# Patient Record
Sex: FEMALE | Race: WHITE | NOT HISPANIC OR LATINO | URBAN - METROPOLITAN AREA
[De-identification: names, ages, dates, MRNs, and addresses within clinical notes are randomized per-mention and may not be internally consistent; named-entity substitution may affect disease eponyms.]

---

## 2022-02-01 ENCOUNTER — OFFICE VISIT (OUTPATIENT)
Dept: OBGYN | Facility: CLINIC | Age: 22
End: 2022-02-01
Attending: ADVANCED PRACTICE MIDWIFE
Payer: COMMERCIAL

## 2022-02-01 VITALS
SYSTOLIC BLOOD PRESSURE: 110 MMHG | DIASTOLIC BLOOD PRESSURE: 75 MMHG | BODY MASS INDEX: 17.55 KG/M2 | HEART RATE: 60 BPM | WEIGHT: 118.5 LBS | HEIGHT: 69 IN

## 2022-02-01 DIAGNOSIS — S76.311A STRAIN OF RIGHT HAMSTRING MUSCLE, INITIAL ENCOUNTER: ICD-10-CM

## 2022-02-01 PROBLEM — S86.899A SHIN SPLINTS: Status: ACTIVE | Noted: 2018-01-15

## 2022-02-01 PROBLEM — I73.00 RAYNAUDS DISEASE: Status: ACTIVE | Noted: 2017-06-23

## 2022-02-01 PROBLEM — M84.374G: Status: ACTIVE | Noted: 2020-04-17

## 2022-02-01 PROCEDURE — G0463 HOSPITAL OUTPT CLINIC VISIT: HCPCS

## 2022-02-01 PROCEDURE — 99204 OFFICE O/P NEW MOD 45 MIN: CPT | Performed by: ADVANCED PRACTICE MIDWIFE

## 2022-02-01 ASSESSMENT — ANXIETY QUESTIONNAIRES
7. FEELING AFRAID AS IF SOMETHING AWFUL MIGHT HAPPEN: NOT AT ALL
1. FEELING NERVOUS, ANXIOUS, OR ON EDGE: NOT AT ALL
3. WORRYING TOO MUCH ABOUT DIFFERENT THINGS: NOT AT ALL
6. BECOMING EASILY ANNOYED OR IRRITABLE: NOT AT ALL
5. BEING SO RESTLESS THAT IT IS HARD TO SIT STILL: NOT AT ALL
GAD7 TOTAL SCORE: 0
2. NOT BEING ABLE TO STOP OR CONTROL WORRYING: NOT AT ALL

## 2022-02-01 ASSESSMENT — MIFFLIN-ST. JEOR: SCORE: 1356.89

## 2022-02-01 ASSESSMENT — PATIENT HEALTH QUESTIONNAIRE - PHQ9: 5. POOR APPETITE OR OVEREATING: NOT AT ALL

## 2022-02-01 NOTE — LETTER
"2/1/2022       RE: Lily Henson  [address]  [facility] [address]     Dear Colleague,    Thank you for referring your patient, Lliy Henson, to the [facility] [place] at [facility]. Please see a copy of my visit note below.        Office Problem Visit        Assessment/Plan:  1)  21 yo G0 contraceptive questions: Reviewed that Mirena IUD is effective for 7 years in preventing pregnancy. Typically women continue to ovulate 70% of the time with Mirena. Loss of bone density is not something that is typically associated with Mirena. Will investigate this further and report back to Lily   -Reviewed Articles regarding Bone Density and IUD use. Shared that \"Use of progestin-releasing intrauterine device is associated with low systemic progestin levels that do not appear to impact ovarian estradiol production or bone mineral denisty.\" (Skye Bazan, 2009. Use of hormonal contraception in adolescents: skeletal health issues. Current Opinion in Obstetrics & Gynecology, 21: 396-401.)  -\"Folliculogenesis usually remains normal in LNG-IUS users, the endogenous estradiol levels in LNG-IUS, and hence the risk of BMD loss is not plausible. The LNG-IUS has no negative effects on bone mass denisty, even during long-term use.\" Nabil Llanos P. & FLAKO Gu 2011. Recent developments in the clinical use of levonorgestrel-releasing intrauterine system. [employer] Federation of Societies of Obstetrics & Gynecology 90: 7164-1696.)  -discussed that not many young women have their bone density assessed, and this could be an incidental finding.   - Advised against Paragard given her history of heavy painful menstrual cycles.   -After discussing her options, Lily would like to keep her Mirena IUD in place for now.  -Encouraged increasing her BMI by eating more high fat foods such as avocados, nuts, full fat " yogurt, milk, etc.   -Encouraged daily calcium and vitamin D intake 1,200mg Calcium and 2,000 international unit(s) vitamin D  2)  Preventive health exam: encouraged Lily to schedule this summer when she is due.   3.) Pelvic floor, gluteus and hamstring pain strain. Recommended pelvic floor PT, referral placed.     Subjective:    Lily is a 22 year old  female who presents to discuss removing her IUD.      She is a new patient to the [facility].   LMP has brown spotting for 4-5 days once a month.  Currently sexually active with one male partner. 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 She was getting 2-3 stress fractures/year and had a bone density scan that showed she had osteopenia in  with a T-score of 0.9. She has a low BMI (today is 17.5). She has always been tall and lean. She weighs slightly less than she normally does because she is not lifting weights.   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 She is now running 4-5 days a week with some biking. (she is running much less now that she is out of school and has not had a stress fracture).  Lily is wondering if she is not ovulating and if that might be contributing to her low density.     Last February pulled her right gluteus thomas and still occasionally has shooting pain down her left leg. She feels like this pain recurs when she is getting her brown spotting/cycling    Lily's menstrual cycles typically are heavy and painful when she is not on the pill or Mirena.      Denies abnormal vaginal discharge, itching, irritation, odor, dyspareunia, or dysuria.      Review of Systems:  Also a 12 point comprehensive review of systems was negative except as noted.  "    Medical, Surgical, Social and Family history reviewed.       Objective:   Vitals:    Vitals:    22 1424   BP: 110/75   BP Location: Left arm   Patient Position: Chair   Pulse: 60   Weight: 53.8 kg (118 lb 8 oz)   Height: 1.753 m (5' 9\")       Physical Exam:  General: Pleasant, articulate, well-groomed, well-nourished female.  Not in any apparent distress  Neck: Supple  Lungs:  nonlabored breathing pattern  Neuro: alert and oriented x 3     Time spent:  Chart review/Pre-chartin min on day of service  Face-to-face visit: 40   Documentation: 5  Total time spent on the day of service: 50 minutes      CINTIA Rae CNM          Again, thank you for allowing me to participate in the care of your patient.      Sincerely,    Augusta Vila CNM      "

## 2022-02-01 NOTE — PROGRESS NOTES
"    Office Problem Visit        Assessment/Plan:  1)  21 yo G0 contraceptive questions: Reviewed that Mirena IUD is effective for 7 years in preventing pregnancy. Typically women continue to ovulate 70% of the time with Mirena. Loss of bone density is not something that is typically associated with Mirena. Will investigate this further and report back to Lily   -Reviewed Articles regarding Bone Density and IUD use. Shared that \"Use of progestin-releasing intrauterine device is associated with low systemic progestin levels that do not appear to impact ovarian estradiol production or bone mineral denisty.\" (Skye Bazan, 2009. Use of hormonal contraception in adolescents: skeletal health issues. Current Opinion in Obstetrics & Gynecology, 21: 396-401.)  -\"Folliculogenesis usually remains normal in LNG-IUS users, the endogenous estradiol levels in LNG-IUS, and hence the risk of BMD loss is not plausible. The LNG-IUS has no negative effects on bone mass denisty, even during long-term use.\" Nabil Llanos P. & FLAKO Gu 2011. Recent developments in the clinical use of levonorgestrel-releasing intrauterine system. [employer] Federation of Societies of Obstetrics & Gynecology 90: 2882-9279.)  -discussed that not many young women have their bone density assessed, and this could be an incidental finding.   - Advised against Paragard given her history of heavy painful menstrual cycles.   -After discussing her options, Lily would like to keep her Mirena IUD in place for now.  -Encouraged increasing her BMI by eating more high fat foods such as avocados, nuts, full fat yogurt, milk, etc.   -Encouraged daily calcium and vitamin D intake 1,200mg Calcium and 2,000 international unit(s) vitamin D  2)  Preventive health exam: encouraged Lily to schedule this summer when she is due.   3.) Pelvic floor, gluteus and hamstring pain strain. Recommended pelvic floor PT, referral placed.     Subjective:    Lily is " "a 22 year old  female who presents to discuss removing her IUD.      She is a new patient to the [facility].   LMP has brown spotting for 4-5 days once a month.  Currently sexually active with one male partner. 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 She was getting 2-3 stress fractures/year and had a bone density scan that showed she had osteopenia in 2019 with a T-score of 0.9. She has a low BMI (today is 17.5). She has always been tall and lean. She weighs slightly less than she normally does because she is not lifting weights.   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 She is now running 4-5 days a week with some biking. (she is running much less now that she is out of school and has not had a stress fracture).  Lily is wondering if she is not ovulating and if that might be contributing to her low density.     Last February pulled her right gluteus thomas and still occasionally has shooting pain down her left leg. She feels like this pain recurs when she is getting her brown spotting/cycling    Lily's menstrual cycles typically are heavy and painful when she is not on the pill or Mirena.      Denies abnormal vaginal discharge, itching, irritation, odor, dyspareunia, or dysuria.      Review of Systems:  Also a 12 point comprehensive review of systems was negative except as noted.     Medical, Surgical, Social and Family history reviewed.       Objective:   Vitals:    Vitals:    22 1424   BP: 110/75   BP Location: Left arm   Patient Position: Chair   Pulse: 60   Weight: 53.8 kg (118 lb 8 oz)   Height: 1.753 m (5' 9\")       Physical Exam:  General: Pleasant, articulate, well-groomed, well-nourished female.  Not in any " apparent distress  Neck: Supple  Lungs:  nonlabored breathing pattern  Neuro: alert and oriented x 3     Time spent:  Chart review/Pre-chartin min on day of service  Face-to-face visit: 40   Documentation: 5  Total time spent on the day of service: 50 minutes      CINTIA Rae, CNM

## 2022-02-02 ASSESSMENT — ANXIETY QUESTIONNAIRES: GAD7 TOTAL SCORE: 0

## 2022-02-03 PROBLEM — S76.311A STRAIN OF RIGHT HAMSTRING MUSCLE: Status: ACTIVE | Noted: 2022-02-03

## 2022-02-06 ENCOUNTER — HEALTH MAINTENANCE LETTER (OUTPATIENT)
Age: 22
End: 2022-02-06

## 2022-10-03 ENCOUNTER — HEALTH MAINTENANCE LETTER (OUTPATIENT)
Age: 22
End: 2022-10-03

## 2022-10-27 ENCOUNTER — OFFICE VISIT (OUTPATIENT)
Dept: FAMILY MEDICINE | Facility: CLINIC | Age: 22
End: 2022-10-27
Payer: COMMERCIAL

## 2022-10-27 VITALS
DIASTOLIC BLOOD PRESSURE: 74 MMHG | HEIGHT: 69 IN | HEART RATE: 50 BPM | TEMPERATURE: 98.5 F | SYSTOLIC BLOOD PRESSURE: 115 MMHG | BODY MASS INDEX: 17.77 KG/M2 | OXYGEN SATURATION: 100 % | WEIGHT: 120 LBS

## 2022-10-27 DIAGNOSIS — Z12.4 CERVICAL CANCER SCREENING: ICD-10-CM

## 2022-10-27 DIAGNOSIS — Z13.1 SCREENING FOR DIABETES MELLITUS: ICD-10-CM

## 2022-10-27 DIAGNOSIS — Z11.3 SCREENING FOR STDS (SEXUALLY TRANSMITTED DISEASES): ICD-10-CM

## 2022-10-27 DIAGNOSIS — G43.809 OTHER MIGRAINE WITHOUT STATUS MIGRAINOSUS, NOT INTRACTABLE: ICD-10-CM

## 2022-10-27 DIAGNOSIS — D50.8 IRON DEFICIENCY ANEMIA SECONDARY TO INADEQUATE DIETARY IRON INTAKE: ICD-10-CM

## 2022-10-27 DIAGNOSIS — Z13.220 SCREENING FOR CHOLESTEROL LEVEL: Primary | ICD-10-CM

## 2022-10-27 PROCEDURE — 99385 PREV VISIT NEW AGE 18-39: CPT

## 2022-10-27 PROCEDURE — 99214 OFFICE O/P EST MOD 30 MIN: CPT | Mod: 25

## 2022-10-27 PROCEDURE — G0145 SCR C/V CYTO,THINLAYER,RESCR: HCPCS

## 2022-10-27 RX ORDER — NAPROXEN 500 MG/1
500 TABLET ORAL 2 TIMES DAILY PRN
Qty: 30 TABLET | Refills: 3 | Status: SHIPPED | OUTPATIENT
Start: 2022-10-27 | End: 2023-01-02

## 2022-10-27 ASSESSMENT — ENCOUNTER SYMPTOMS
WEAKNESS: 0
HEARTBURN: 0
CHILLS: 0
COUGH: 0
NERVOUS/ANXIOUS: 0
NAUSEA: 0
FEVER: 0
SORE THROAT: 0
HEMATOCHEZIA: 0
DYSURIA: 0
HEADACHES: 0
PARESTHESIAS: 0
DIARRHEA: 0
MYALGIAS: 0
CONSTIPATION: 0
ARTHRALGIAS: 0
ABDOMINAL PAIN: 0
HEMATURIA: 0
EYE PAIN: 0
DIZZINESS: 0
JOINT SWELLING: 0
PALPITATIONS: 0
SHORTNESS OF BREATH: 0
FREQUENCY: 0

## 2022-10-27 ASSESSMENT — PAIN SCALES - GENERAL: PAINLEVEL: NO PAIN (0)

## 2022-10-27 NOTE — PROGRESS NOTES
SUBJECTIVE:   CC: Lily is an 22 year old who presents for preventive health visit.     History of bone density, iron issues, post injuries from collegiate running.    HPI:   Gets migraines in the fall in the change of medications. Sharp, pounding pain in the head. In the cold, patient also experiences nausea. This occurs on a seasonal basis.Sharp behind the eyes, pounding, from the neck. She has tried ibuprofen/Advil without relief and does not currently take any migraine prophylaxis. She reports having a history of these headaches since college.    Patient has been advised of split billing requirements and indicates understanding: Yes  Healthy Habits:     Getting at least 3 servings of Calcium per day:  Yes    Bi-annual eye exam:  NO    Dental care twice a year:  Yes    Sleep apnea or symptoms of sleep apnea:  None    Diet:  Regular (no restrictions)    Frequency of exercise:  6-7 days/week    Duration of exercise:  45-60 minutes    Taking medications regularly:  Not Applicable    Medication side effects:  Not applicable    PHQ-2 Total Score: 0    Additional concerns today:  No    Exercise: running (less flaring up pain)  Occupation: - [employer] major.  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  Sleep: Not great at baseline, but has been good lately.    MH: no depression or anxiety    Today's PHQ-2 Score:   PHQ-2 ( 1999 Pfizer) 10/27/2022   Q1: Little interest or pleasure in doing things 0   Q2: Feeling down, depressed or hopeless 0   PHQ-2 Score 0   Q1: Little interest or pleasure in doing things Not at all   Q2: Feeling down, depressed or hopeless Not at all   PHQ-2 Score 0       Abuse: Current or Past (Physical, Sexual or Emotional) - No  Do you feel safe in your environment? Yes    Have you ever done Advance Care Planning? (For example, a Health Directive, POLST, or a discussion with a medical provider or your loved ones about your wishes): No, advance care  planning information given to patient to review.  Patient declined advance care planning discussion at this time.    Social History     Tobacco Use     Smoking status: Never     Smokeless tobacco: Never   Substance Use Topics     Alcohol use: Yes     Comment: 5-7 drinks/wk (spaced out throughout the week).     If you drink alcohol do you typically have >3 drinks per day or >7 drinks per week? Yes      Alcohol Use 10/27/2022   Prescreen: >3 drinks/day or >7 drinks/week? Yes   Prescreen: >3 drinks/day or >7 drinks/week? -   AUDIT SCORE  6     AUDIT - Alcohol Use Disorders Identification Test - Reproduced from the World Health Organization Audit 2001 (Second Edition) 10/27/2022   1.  How often do you have a drink containing alcohol? 2 to 3 times a week   2.  How many drinks containing alcohol do you have on a typical day when you are drinking? 1 or 2   3.  How often do you have five or more drinks on one occasion? Weekly   4.  How often during the last year have you found that you were not able to stop drinking once you had started? Never   5.  How often during the last year have you failed to do what was normally expected of you because of drinking? Never   6.  How often during the last year have you needed a first drink in the morning to get yourself going after a heavy drinking session? Never   7.  How often during the last year have you had a feeling of guilt or remorse after drinking? Never   8.  How often during the last year have you been unable to remember what happened the night before because of your drinking? Never   9.  Have you or someone else been injured because of your drinking? No   10. Has a relative, friend, doctor or other health care worker been concerned about your drinking or suggested you cut down? No   TOTAL SCORE 6         Breast Cancer Screening: Not due for mammogram.         History of abnormal Pap smear: NO - age 21-29 PAP every 3 years recommended     Reviewed and updated as needed this  "visit by clinical staff   Tobacco  Allergies    Med Hx  Surg Hx  Fam Hx  Soc Hx        Reviewed and updated as needed this visit by Provider   Tobacco     Med Hx  Surg Hx  Fam Hx           Review of Systems   Constitutional: Negative for chills and fever.   HENT: Negative for congestion, ear pain, hearing loss and sore throat.    Eyes: Negative for pain and visual disturbance.   Respiratory: Negative for cough and shortness of breath.    Cardiovascular: Negative for chest pain, palpitations and peripheral edema.   Gastrointestinal: Negative for abdominal pain, constipation, diarrhea, heartburn, hematochezia and nausea.   Genitourinary: Negative for dysuria, frequency, genital sores, hematuria and urgency.   Musculoskeletal: Negative for arthralgias, joint swelling and myalgias.   Skin: Negative for rash.   Neurological: Negative for dizziness, weakness, headaches and paresthesias.   Psychiatric/Behavioral: Negative for mood changes. The patient is not nervous/anxious.        OBJECTIVE:   /74 (BP Location: Left arm, Patient Position: Sitting, Cuff Size: Adult Regular)   Pulse 50   Temp 98.5  F (36.9  C) (Temporal)   Ht 1.743 m (5' 8.62\")   Wt 54.4 kg (120 lb)   LMP 10/10/2022 (Exact Date)   SpO2 100%   BMI 17.92 kg/m    Physical Exam  GENERAL: healthy, alert and no distress  EYES: Eyes grossly normal to inspection, PERRL and conjunctivae and sclerae normal  HENT: ear canals and TM's normal, nose and mouth without ulcers or lesions  NECK: no adenopathy, no asymmetry, masses, or scars and thyroid normal to palpation  RESP: lungs clear to auscultation - no rales, rhonchi or wheezes  BREAST: normal without masses, tenderness or nipple discharge and no palpable axillary masses or adenopathy  CV: regular rate and rhythm, normal S1 S2, no S3 or S4, no murmur, click or rub, no peripheral edema and peripheral pulses strong  ABDOMEN: soft, nontender, no hepatosplenomegaly, no masses and bowel sounds " "normal   (female): normal female external genitalia, normal urethral meatus, vaginal mucosa pink, moist, well rugated, and normal cervix/adnexa/uterus without masses or discharge  MS: no gross musculoskeletal defects noted, no edema  SKIN: no suspicious lesions or rashes  NEURO: Normal strength and tone, mentation intact and speech normal  PSYCH: mentation appears normal, affect normal/bright      ASSESSMENT/PLAN:   (Z13.220) Screening for cholesterol level  (primary encounter diagnosis)  Plan: Lipid panel reflex to direct LDL Fasting    (Z11.3) Screening for STDs (sexually transmitted diseases)  Plan: NEISSERIA GONORRHOEA PCR, CHLAMYDIA TRACHOMATIS        PCR    (Z12.4) Cervical cancer screening  Plan: Pap Screen only - recommended age 21 - 24 years    (Z13.1) Screening for diabetes mellitus  Plan: Glucose    (D50.8) Iron deficiency anemia secondary to inadequate dietary iron intake  Plan: CBC with platelets, Ferritin    (G43.809) Other migraine without status migrainosus, not intractable  Worsening  Plan: naproxen (NAPROSYN) 500 MG tablet  Decision making with patient on whether she would like to try nifedipine for migraine prophylaxis and treatment of her Raynaud's.  I also provided patient with option for trying naproxen for acute treatment of migraines.  Patient expressed a preference for trying naproxen at this time and would like to think about options for prophylaxis.  I also advised patient to avoid triggers for migraines including poor sleep poor hydration.        COUNSELING:  Reviewed preventive health counseling, as reflected in patient instructions       Regular exercise       Healthy diet/nutrition    Estimated body mass index is 17.92 kg/m  as calculated from the following:    Height as of this encounter: 1.743 m (5' 8.62\").    Weight as of this encounter: 54.4 kg (120 lb).    Weight management plan noted, stable and monitoring    She reports that she has never smoked. She has never used smokeless " tobacco.      Counseling Resources:  ATP IV Guidelines  Pooled Cohorts Equation Calculator  Breast Cancer Risk Calculator  BRCA-Related Cancer Risk Assessment: FHS-7 Tool  FRAX Risk Assessment  ICSI Preventive Guidelines  Dietary Guidelines for Americans, 2010  USDA's MyPlate  ASA Prophylaxis  Lung CA Screening    Jay Gill NP  [facility]

## 2022-10-31 LAB
BKR LAB AP GYN ADEQUACY: NORMAL
BKR LAB AP GYN INTERPRETATION: NORMAL
BKR LAB AP HPV REFLEX: NO
BKR LAB AP PREVIOUS ABNORMAL: NORMAL
PATH REPORT.COMMENTS IMP SPEC: NORMAL
PATH REPORT.COMMENTS IMP SPEC: NORMAL
PATH REPORT.RELEVANT HX SPEC: NORMAL

## 2022-11-29 ENCOUNTER — LAB (OUTPATIENT)
Dept: LAB | Facility: CLINIC | Age: 22
End: 2022-11-29
Payer: COMMERCIAL

## 2022-11-29 DIAGNOSIS — Z13.1 SCREENING FOR DIABETES MELLITUS: ICD-10-CM

## 2022-11-29 DIAGNOSIS — Z11.3 SCREENING FOR STDS (SEXUALLY TRANSMITTED DISEASES): ICD-10-CM

## 2022-11-29 DIAGNOSIS — D50.8 IRON DEFICIENCY ANEMIA SECONDARY TO INADEQUATE DIETARY IRON INTAKE: ICD-10-CM

## 2022-11-29 DIAGNOSIS — Z13.220 SCREENING FOR CHOLESTEROL LEVEL: ICD-10-CM

## 2022-11-29 LAB
CHOLEST SERPL-MCNC: 177 MG/DL
ERYTHROCYTE [DISTWIDTH] IN BLOOD BY AUTOMATED COUNT: 13.7 % (ref 10–15)
FASTING STATUS PATIENT QL REPORTED: YES
GLUCOSE SERPL-MCNC: 84 MG/DL (ref 70–99)
HCT VFR BLD AUTO: 39.9 % (ref 35–47)
HDLC SERPL-MCNC: 75 MG/DL
HGB BLD-MCNC: 13.3 G/DL (ref 11.7–15.7)
LDLC SERPL CALC-MCNC: 91 MG/DL
MCH RBC QN AUTO: 29 PG (ref 26.5–33)
MCHC RBC AUTO-ENTMCNC: 33.3 G/DL (ref 31.5–36.5)
MCV RBC AUTO: 87 FL (ref 78–100)
NONHDLC SERPL-MCNC: 102 MG/DL
PLATELET # BLD AUTO: 315 10E3/UL (ref 150–450)
RBC # BLD AUTO: 4.59 10E6/UL (ref 3.8–5.2)
TRIGL SERPL-MCNC: 53 MG/DL
WBC # BLD AUTO: 5.3 10E3/UL (ref 4–11)

## 2022-11-29 PROCEDURE — 36415 COLL VENOUS BLD VENIPUNCTURE: CPT

## 2022-11-29 PROCEDURE — 80061 LIPID PANEL: CPT

## 2022-11-29 PROCEDURE — 85027 COMPLETE CBC AUTOMATED: CPT

## 2022-11-29 PROCEDURE — 82947 ASSAY GLUCOSE BLOOD QUANT: CPT

## 2022-11-29 PROCEDURE — 82728 ASSAY OF FERRITIN: CPT

## 2022-11-29 PROCEDURE — 87591 N.GONORRHOEAE DNA AMP PROB: CPT

## 2022-11-29 PROCEDURE — 87491 CHLMYD TRACH DNA AMP PROBE: CPT

## 2022-11-30 LAB
C TRACH DNA SPEC QL NAA+PROBE: NEGATIVE
FERRITIN SERPL-MCNC: 18 NG/ML (ref 6–175)
N GONORRHOEA DNA SPEC QL NAA+PROBE: NEGATIVE

## 2022-12-17 ENCOUNTER — E-VISIT (OUTPATIENT)
Dept: URGENT CARE | Facility: CLINIC | Age: 22
End: 2022-12-17
Payer: COMMERCIAL

## 2022-12-17 DIAGNOSIS — R21 RASH: Primary | ICD-10-CM

## 2022-12-17 PROCEDURE — 99207 PR NON-BILLABLE SERV PER CHARTING: CPT | Performed by: NURSE PRACTITIONER

## 2022-12-18 NOTE — PATIENT INSTRUCTIONS
Dear Lily Henson,    We are sorry you are not feeling well. Based on the responses you provided, it is recommended that you be seen in-person in urgent care so we can better evaluate your symptoms. Please click here to find the nearest urgent care location to you.   You will not be charged for this Visit. Thank you for trusting us with your care.    Mar Hightower, CNP

## 2022-12-27 ENCOUNTER — OFFICE VISIT (OUTPATIENT)
Dept: URGENT CARE | Facility: URGENT CARE | Age: 22
End: 2022-12-27
Payer: COMMERCIAL

## 2022-12-27 VITALS
SYSTOLIC BLOOD PRESSURE: 105 MMHG | TEMPERATURE: 97.8 F | RESPIRATION RATE: 16 BRPM | DIASTOLIC BLOOD PRESSURE: 73 MMHG | OXYGEN SATURATION: 100 % | HEART RATE: 78 BPM | WEIGHT: 118 LBS | BODY MASS INDEX: 17.62 KG/M2

## 2022-12-27 DIAGNOSIS — L73.9 FOLLICULITIS: Primary | ICD-10-CM

## 2022-12-27 DIAGNOSIS — L30.4 INTERTRIGO: ICD-10-CM

## 2022-12-27 PROCEDURE — 99213 OFFICE O/P EST LOW 20 MIN: CPT | Performed by: NURSE PRACTITIONER

## 2022-12-27 RX ORDER — SULFAMETHOXAZOLE/TRIMETHOPRIM 800-160 MG
1 TABLET ORAL 2 TIMES DAILY
Qty: 14 TABLET | Refills: 0 | Status: SHIPPED | OUTPATIENT
Start: 2022-12-27 | End: 2022-12-27

## 2022-12-27 RX ORDER — CLOTRIMAZOLE 1 %
CREAM (GRAM) TOPICAL 2 TIMES DAILY
Qty: 60 G | Refills: 0 | Status: SHIPPED | OUTPATIENT
Start: 2022-12-27 | End: 2023-01-10

## 2022-12-27 RX ORDER — SULFAMETHOXAZOLE/TRIMETHOPRIM 800-160 MG
1 TABLET ORAL 2 TIMES DAILY
Qty: 20 TABLET | Refills: 0 | Status: SHIPPED | OUTPATIENT
Start: 2022-12-27 | End: 2023-01-02

## 2022-12-27 NOTE — PATIENT INSTRUCTIONS
Family Medicine Note:     CC:  Chief Complaint   Patient presents with   â¢ Office Visit     Covid Postitive - 6-, still not feeling well, headache, fatigue, no smell or tatse, Sunday started taking an old prescription of amoxicillin. HPI: The patient is a 35year old female presenting with ongoing nasal congestion, cough, and headache after testing positive for Covid on 6/16/2022. #Rhinorrhea, cough  #Covid positive  - Positive 2 weeks ago - kept having to clear throat, throat was sore so she went to get tested  - Started having body aches, headaches, no fever   - Rapid was negative, PCR was positive  - Has been home since then - now on vacation  - Still having symptoms - headache, congestion, coughing up mucus, sometimes has chest pain with coughing   - Mucus sometimes clear, sometimes greenish - took an old amoxicillin prescription -   - Taking a lot of naps  - Appetite, taste and smell is off - has lost a few pounds   - Got first two doses of vaccines  - Feeling much better than when first tested postive   - Took mucinex at first - hasn't taken any meds for headaches or other meds for symptoms  - No CP outside of hard cough, no COOL, no SOB, not waking up gasping, no leg swelling  - No diarrhea, occasional vomiting when coughs really hard - keeping things down, able to eat and drink normally     ROS: 10 points review of systems were negative except mentioned in HPI. Patient's medications, allergies, past medical, surgical, social and family histories were reviewed and updated as appropriate. Histories:  Problem List:  2022-06: PBKAJ-56 virus infection  2021-11: Onychomycosis  2020-11: Vitamin D deficiency  2020-11: Neoplasm of uncertain behavior of skin  2019-09: Encounter for general adult medical examination w/o abnormal   findings  2019-09:  Annual physical exam  2019-09: Dyslipidemia  2017-12: Rib pain on right side     Past Medical History:   Diagnosis Date   â¢ No known problems Likely intertrigo with components of candida folliculitis as well as deeper cellulitis infection  Bactrim and clotrimazole mixed with desitin purple zinc  Dr. Tinoco's ivan unscented, go back to [facility] after healed  Give 2-3 days for improvement  Reevaluation if worse, fevers, vomiting, rapid spreading   "    No past surgical history on file. No current outpatient medications on file. No current facility-administered medications for this visit. No Known Allergies  Social History     Socioeconomic History   â¢ Marital status: Single     Spouse name: Not on file   â¢ Number of children: Not on file   â¢ Years of education: Not on file   â¢ Highest education level: Not on file   Occupational History   â¢ Not on file   Tobacco Use   â¢ Smoking status: Never Smoker   â¢ Smokeless tobacco: Never Used   Vaping Use   â¢ Vaping Use: never used   Substance and Sexual Activity   â¢ Alcohol use: Not Currently   â¢ Drug use: Never   â¢ Sexual activity: Yes     Partners: Male   Other Topics Concern   â¢ Not on file   Social History Narrative    Works in [place] at [address] [employer] Strain: Not on file   Food Insecurity: Not on file   Transportation Needs: Not on file   Physical Activity: Not on file   Stress: Not on file   Social Connections: Not on file   Intimate Partner Violence: Not on file     Social History     Social History Narrative    Works in [employer] at [employer] [facility] [place]       Objective:   Visit Vitals  /72   Pulse 79   Temp 98.7 Â°F (37.1 Â°C) (Temporal)   Ht 5' 4"" (1.626 m)   Wt 61.7 kg (136 lb)   BMI 23.34 kg/mÂ²       Physical Exam  Constitutional:       General: She is not in acute distress. Appearance: Normal appearance. She is normal weight. HENT:      Head: Normocephalic. Right Ear: Tympanic membrane and ear canal normal.      Left Ear: Tympanic membrane and ear canal normal.      Nose: Congestion and rhinorrhea present. Right Sinus: No maxillary sinus tenderness or frontal sinus tenderness. Left Sinus: No maxillary sinus tenderness or frontal sinus tenderness. Mouth/Throat:      Mouth: Mucous membranes are moist.      Pharynx: Oropharynx is clear. No posterior oropharyngeal erythema.    Eyes:      Conjunctiva/sclera: Conjunctivae normal.   Cardiovascular:    " Rate and Rhythm: Normal rate and regular rhythm. Heart sounds: Normal heart sounds. Pulmonary:      Effort: No respiratory distress. Breath sounds: Normal breath sounds. No wheezing. Neurological:      Mental Status: She is alert. A/P: 35year old female presenting after testing positive for Covid two weeks ago with ongoing Covid symptoms consistent with course of Covid infection.     (U07.1) COVID-19 virus infection    Problem List Items Addressed This Visit        Infectious Diseases    COVID-19 virus infection - Primary     - Tested positive on PCR on 6/16/2022  - Vital signs stable today, no concern for respiratory distress on exam  - Continues with nasal congestion, headache, cough with mucus production although symptoms are improving per patient  - Discussed conservative treatment including saline flush for congestion, OTC Advil for headache as needed  - Educated patient around Covid symptom timeline and contagious period   - Instructed to discontinue Augmentin as no indication of bacterial sinus infection at today's visit  - RTC in 2 weeks if symptoms worsen or are not improving                  Medical compliance with plan discussed and risks of non-compliance reviewed. Patient education completed on disease process, etiology & prognosis. Patient expresses understanding of the plan. Proper usage and side effects of medications reviewed & discussed. Return to clinic as clinically indicated as discussed with patient who verbalized understanding of & agreement with the plan. Patient seen by and discussed with Dr. Pepito Tejada.     Leigh Ann Mondragon MD

## 2022-12-27 NOTE — PROGRESS NOTES
Chief Complaint   Patient presents with     Urgent Care     Rash     Rash under armpits x3 weeks.     SUBJECTIVE:  Lily Henson is a 22 year old female who presents to the clinic today with a rash in her armpits for 2 weeks.  It started as large clear filled blisters that were dry and with opening.  Now she has beefy red raw tender tissue with dryness and red satellite lesions.  It is warm.  She also had a cold so hard to tell if she feels ill from that.  Started right before using a new deodorant and then she was out of town and used new bath soap.  She is a runner so often sweats.  Has not shaved since this started.    Past Medical History:   Diagnosis Date     Anemia     age 14     Chlamydia      Fractures, stress      Migraines     occasional     Raynaud's disease     toes     Varicella     vaccinated     levonorgestrel (MIRENA) 20 MCG/24HR IUD,   naproxen (NAPROSYN) 500 MG tablet, Take 1 tablet (500 mg) by mouth 2 times daily as needed for moderate pain    No current facility-administered medications on file prior to visit.    Social History     Tobacco Use     Smoking status: Never     Smokeless tobacco: Never   Substance Use Topics     Alcohol use: Yes     Comment: 5-7 drinks/wk (spaced out throughout the week).     Allergies   Allergen Reactions     Penicillins Rash       Review of Systems   All systems negative except for those listed above in HPI.    EXAM:   /73   Pulse 78   Temp 97.8  F (36.6  C) (Temporal)   Resp 16   Wt 53.5 kg (118 lb)   SpO2 100%   BMI 17.62 kg/m      Physical Exam  Vitals reviewed.   Constitutional:       General: She is not in acute distress.     Appearance: Normal appearance. She is not toxic-appearing or diaphoretic.   HENT:      Head: Normocephalic and atraumatic.   Cardiovascular:      Rate and Rhythm: Normal rate.   Pulmonary:      Effort: Pulmonary effort is normal.   Musculoskeletal:         General: Normal range of motion.   Skin:     General: Skin is  warm and dry.      Findings: Erythema, lesion and rash present.      Comments: Red raw edematous tender rash with fissuring in axillae, satellite lesions red dots.   Neurological:      General: No focal deficit present.      Mental Status: She is alert and oriented to person, place, and time.   Psychiatric:         Mood and Affect: Mood normal.         Behavior: Behavior normal.       ASSESSMENT:    ICD-10-CM    1. Folliculitis  L73.9 clotrimazole (LOTRIMIN) 1 % external cream     sulfamethoxazole-trimethoprim (BACTRIM DS) 800-160 MG tablet     DISCONTINUED: sulfamethoxazole-trimethoprim (BACTRIM DS) 800-160 MG tablet      2. Intertrigo  L30.4 clotrimazole (LOTRIMIN) 1 % external cream     sulfamethoxazole-trimethoprim (BACTRIM DS) 800-160 MG tablet     DISCONTINUED: sulfamethoxazole-trimethoprim (BACTRIM DS) 800-160 MG tablet        PLAN:    Likely intertrigo with components of candida folliculitis as well as deeper cellulitis infection  Bactrim and clotrimazole mixed with desitin purple zinc  Dr. Tinoco's ivan unscented, go back to David's deodorant after healed  Give 2-3 days for improvement  Reevaluation if worse, fevers, vomiting, rapid spreading    Follow up with primary care provider with any problems, questions or concerns or if symptoms worsen or fail to improve. Patient agreed to plan and verbalized understanding.    Angela James, [facility]

## 2022-12-30 DIAGNOSIS — R21 RASH: Primary | ICD-10-CM

## 2022-12-30 RX ORDER — DOXYCYCLINE 100 MG/1
100 CAPSULE ORAL 2 TIMES DAILY
Qty: 20 CAPSULE | Refills: 0 | Status: SHIPPED | OUTPATIENT
Start: 2022-12-30 | End: 2023-01-09

## 2023-01-02 ENCOUNTER — OFFICE VISIT (OUTPATIENT)
Dept: FAMILY MEDICINE | Facility: CLINIC | Age: 23
End: 2023-01-02
Payer: COMMERCIAL

## 2023-01-02 VITALS
RESPIRATION RATE: 15 BRPM | BODY MASS INDEX: 16.88 KG/M2 | OXYGEN SATURATION: 100 % | DIASTOLIC BLOOD PRESSURE: 70 MMHG | HEIGHT: 69 IN | SYSTOLIC BLOOD PRESSURE: 117 MMHG | WEIGHT: 114 LBS | TEMPERATURE: 97.9 F | HEART RATE: 80 BPM

## 2023-01-02 DIAGNOSIS — R21 RASH: Primary | ICD-10-CM

## 2023-01-02 PROCEDURE — 99213 OFFICE O/P EST LOW 20 MIN: CPT

## 2023-01-02 ASSESSMENT — PAIN SCALES - GENERAL: PAINLEVEL: NO PAIN (0)

## 2023-01-02 NOTE — PROGRESS NOTES
Assessment & Plan      Rash  Improving  Differentials for rash include cellulitis, tinea infections, contact dermatitis, folliculitis.  Given patient reports that her rash was previously beet red and has improved with doxycycline this may have recently been a cellulitis.  Patient also reports that she had used a new deodorant prior to the onset of this rash and had a small bumpy rash which could have been consistent with contact dermatitis.  I advised her to avoid shaving or placing any new products in that area until it fully heals.  She will finish her full course of doxycycline and clotrimazole and we will reevaluate at that time.  Also advised her to keep a journal of triggers for when she has new breakouts.  It is also possible that she had a sulfa allergy that contributed to the worsening of her symptoms after she was prescribed it.    Return in about 1 week (around 1/9/2023), or if symptoms worsen or fail to improve.    Jay Gill NP  [facility]    Hattie Bautista is a 22 year old presenting for the following health issues:  Derm Problem  Started as painful sores on her armpit and lavonne, that moved to be a tight skin sensation.  She was seen in urgent care for this issue:  Put on sulfa and clotrimazole and was on zinc/Desitin. The sores improved, but it transitioned to be a beet red rash that was painful. I prescribed her doxycyline and advised her to discontinue sulfa  On Friday, patient stopped the sulfa and zinc and started doxycyline. She reports it has been better, but still gets occasional red circular and irregular lesions on right shoulder    Last course of the sulfa was on Friday morning and stopped the zinc Saturday.  She had been taking benadryl.      History of Present Illness       Reason for visit:  Rash follow-up    She eats 4 or more servings of fruits and vegetables daily.She consumes 0 sweetened beverage(s) daily.She exercises with enough effort to increase  "her heart rate 60 or more minutes per day.  She exercises with enough effort to increase her heart rate 7 days per week.   She is taking medications regularly.       Rash  Onset/Duration: 12/13/2022  Description  Location: Bilateral armpits  Character: Generalized itching sores   Itching:   Intensity:  Mild to moderate depending on the day, currently mild  Progression of Symptoms:  Small \"dots\" that turned into blisters that became open sores  Accompanying signs and symptoms:   Fever: Unknown, patient reporting a \"cold\" while experiencing the rash  Body aches or joint pain: Some aching during reported cold  Sore throat symptoms: No  Recent cold symptoms: YES  History:           Previous episodes of similar rash: None  New exposures:  Medicated initially with sulfa, which potentially caused a reaction. Then took doxycycline. Used a topical ointment in red tube that dried it out then caused some burning. Now using Desitin.  Recent travel: No  Exposure to similar rash: No  Precipitating or alleviating factors: Patient reported running in a race in the cold/wet weather a few days prior to rash appearing. Also noted a \"cold\" for a few days during this rash.   Therapies tried and outcome: sulfa (then benadryl for potential reaction), doxycycline, topical ointment, desitin    Review of Systems   Constitutional, HEENT, cardiovascular, pulmonary, gi and gu systems are negative, except as otherwise noted.      Objective    /70 (BP Location: Right arm, Patient Position: Sitting, Cuff Size: Adult Small)   Pulse 80   Temp 97.9  F (36.6  C) (Temporal)   Resp 15   Ht 1.743 m (5' 8.62\")   Wt 51.7 kg (114 lb)   LMP 12/05/2022 (Within Days)   SpO2 100%   BMI 17.02 kg/m    Body mass index is 17.02 kg/m .  Physical Exam   GENERAL: healthy, alert and no distress  SKIN: Small maculopapular rash about 5 mm-1 cm on posterior right shoulder, serpiginous. Regions of generalized erythema on bilateral upper arms.  PSYCH: " mentation appears normal, affect normal/bright

## 2023-05-03 ENCOUNTER — OFFICE VISIT (OUTPATIENT)
Dept: FAMILY MEDICINE | Facility: CLINIC | Age: 23
End: 2023-05-03
Payer: COMMERCIAL

## 2023-05-03 VITALS
OXYGEN SATURATION: 100 % | TEMPERATURE: 97.9 F | HEIGHT: 69 IN | RESPIRATION RATE: 14 BRPM | DIASTOLIC BLOOD PRESSURE: 74 MMHG | WEIGHT: 120 LBS | HEART RATE: 58 BPM | SYSTOLIC BLOOD PRESSURE: 121 MMHG | BODY MASS INDEX: 17.77 KG/M2

## 2023-05-03 DIAGNOSIS — H69.93 DYSFUNCTION OF BOTH EUSTACHIAN TUBES: Primary | ICD-10-CM

## 2023-05-03 DIAGNOSIS — H61.23 IMPACTED CERUMEN OF BOTH EARS: ICD-10-CM

## 2023-05-03 PROCEDURE — 99213 OFFICE O/P EST LOW 20 MIN: CPT

## 2023-05-03 NOTE — PROGRESS NOTES
Assessment & Plan     Impacted cerumen of both ears  - NE REMOVAL IMPACTED CERUMEN IRRIGATION/LVG UNILAT (RN/MA); Standing  Patient tolerated procedure well after RN irrigated bilateral ears.  TMs visualized and without any perforation or abnormalities.    Dysfunction of both eustachian tubes  - Adult ENT  Referral; Future  Symptoms may fit with TMJ etiology, since patient does not have any seasonal allergies or reflux that contribute to eustachian tube dysfunction.  The ear pressure and frequent popping also started after she had her wisdom tooth removal.  She told me that she has been advised by multiple dentists that there is nothing that may be done for her.  I am going to refer her to ENT for second opinion.  She is also contemplating following up with  facial pain clinic, but reports this is cost prohibitive for her with her deductible.  I did advise her that she could try some ice on her jaw in case there is some chronic swelling that is pushing on the eustachian tube.    Jay Gill NP  [facility]    Hattie Bautista is a 23 year old, presenting for the following health issues:    Ear Problem (Ears clogged Left more then Right-used debrox and got better, had wisdom teeth out 4-5 years ago now feels pressure at jaw near ears, referred to Dr for TMJ wanted primary opinion before going)    For the past 4 years, patient has had issues with ear popping. Feels like snap, crackle, pop, pressure sensation. Feels like on air plane. Worse on the left. Most recent dentist recommended a facial pain center, but this is cost prohibitive with her deductible.  This is present on both ears. She had wondered whether she was losing her hearing. Debrox was helpful in restoring the hearing.  Tried a mouthguard without relief.    No history of seasonal allergies, acid reflux, chronic rhinitis, jaw pain, dizziness, no facial numbness.       5/3/2023     9:05 AM   Additional Questions  "  Roomed by More     History of Present Illness       Reason for visit:  Jaw/ear pain  Symptom onset:  More than a month  Symptoms include:  I have a tightening/clicking in my jaw  Symptom intensity:  Moderate  Symptom progression:  Staying the same  Had these symptoms before:  Yes  Has tried/received treatment for these symptoms:  No    She eats 4 or more servings of fruits and vegetables daily.She consumes 1 sweetened beverage(s) daily.She exercises with enough effort to increase her heart rate 60 or more minutes per day.  She exercises with enough effort to increase her heart rate 7 days per week.   She is taking medications regularly.      Review of Systems   Constitutional, HEENT, cardiovascular, pulmonary, gi and gu systems are negative, except as otherwise noted.      Objective    /74 (BP Location: Right arm, Patient Position: Sitting, Cuff Size: Adult Regular)   Pulse 58   Temp 97.9  F (36.6  C) (Temporal)   Resp 14   Ht 1.749 m (5' 8.86\")   Wt 54.4 kg (120 lb)   LMP  (LMP Unknown)   SpO2 100%   BMI 17.79 kg/m    Body mass index is 17.79 kg/m .  Physical Exam   GENERAL: healthy, alert and no distress  EYES: Eyes grossly normal to inspection, PERRL and conjunctivae and sclerae normal  HENT: Cerumen buildup bilaterally. Post irrigation: TMs visualized with bony landmarks and light reflexes in anterior position. No perforations. Patient reports that she gets some relief of pain with mandibular/masseter palpation.  NECK: no adenopathy, no asymmetry, masses, or scars and thyroid normal to palpation  SKIN: no suspicious lesions or rashes  PSYCH: mentation appears normal, affect normal/bright          "

## 2023-06-22 NOTE — TELEPHONE ENCOUNTER
FUTURE VISIT INFORMATION:      FUTURE VISIT INFORMATION:    Date: 9/5/23    Time: 2:15 PM    Location: [facility]  REFERRAL INFORMATION:    Referring provider: Jay Gill NP    Referring providers clinic: [facility]    Reason for visit/diagnosis:  Per Pt, dx ETD referral from Jay Gill NP, recs in epic    RECORDS REQUESTED FROM:       Clinic name Comments Records Status Imaging Status   [facility] 5/3/23 Ov note- Jay Gill NP Epic

## 2023-07-20 ENCOUNTER — OFFICE VISIT (OUTPATIENT)
Dept: FAMILY MEDICINE | Facility: CLINIC | Age: 23
End: 2023-07-20
Payer: COMMERCIAL

## 2023-07-20 VITALS
HEART RATE: 70 BPM | OXYGEN SATURATION: 100 % | RESPIRATION RATE: 15 BRPM | HEIGHT: 69 IN | TEMPERATURE: 97.5 F | BODY MASS INDEX: 17.55 KG/M2 | DIASTOLIC BLOOD PRESSURE: 69 MMHG | SYSTOLIC BLOOD PRESSURE: 120 MMHG | WEIGHT: 118.5 LBS

## 2023-07-20 DIAGNOSIS — M76.60 NON-INSERTIONAL ACHILLES TENDINOPATHY: Primary | ICD-10-CM

## 2023-07-20 DIAGNOSIS — Z23 ENCOUNTER FOR IMMUNIZATION: ICD-10-CM

## 2023-07-20 DIAGNOSIS — R79.0 LOW FERRITIN: ICD-10-CM

## 2023-07-20 LAB — HGB BLD-MCNC: 14.2 G/DL (ref 11.7–15.7)

## 2023-07-20 PROCEDURE — 82728 ASSAY OF FERRITIN: CPT

## 2023-07-20 PROCEDURE — 90471 IMMUNIZATION ADMIN: CPT

## 2023-07-20 PROCEDURE — 85018 HEMOGLOBIN: CPT

## 2023-07-20 PROCEDURE — 36415 COLL VENOUS BLD VENIPUNCTURE: CPT

## 2023-07-20 PROCEDURE — 90715 TDAP VACCINE 7 YRS/> IM: CPT

## 2023-07-20 PROCEDURE — 99213 OFFICE O/P EST LOW 20 MIN: CPT | Mod: 25

## 2023-07-20 ASSESSMENT — PAIN SCALES - GENERAL: PAINLEVEL: NO PAIN (0)

## 2023-07-20 NOTE — PROGRESS NOTES
Assessment & Plan     Non-insertional Achilles tendinopathy  Patient is having continued Achilles tendon issues related to an injury with running. Recommended PT but patient has difficulty with making it to PT before her insurance changes over 11 days from now.  We did discuss some different Maneuvers that she can try for Achilles tendinitis.    Low ferritin  - Hemoglobin; Future  - Ferritin; Future  Bruising less likely to be related to low hemoglobin or ferritin.  Patient expressed preference for lab draw recheck of ferritin given previous low ferritin levels, which I think is reasonable to do today.    Encounter for immunization  - TDAP 10-64Y (ADACEL,BOOSTRIX)    Jay Gill NP  [facility]    Hattie Bautista is a 23 year old, presenting for the following health issues:  bruise (Lasted 3 weeks/Possible anemia)    Had an achilles injury on the right leg from running too much.. She had been going to the chiropractor who had been doing cupping and scrapping with metal. It took about 3 weeks to heal.  No issues with fatigue, cold intolerance, palpitations.  No family history of bleeding disorder.      7/20/2023     1:43 PM   Additional Questions   Roomed by Monique Rosado     History of Present Illness       Reason for visit:  Bruising  Symptom onset:  3-4 weeks ago  Symptoms include:  Bruising  Symptom intensity:  Moderate  Symptom progression:  Improving  Had these symptoms before:  No    She eats 4 or more servings of fruits and vegetables daily.She consumes 0 sweetened beverage(s) daily.She exercises with enough effort to increase her heart rate 60 or more minutes per day.  She exercises with enough effort to increase her heart rate 7 days per week.   She is taking medications regularly.    Lasted 3 weeks  Possible anemia    Review of Systems   Constitutional, HEENT, cardiovascular, pulmonary, gi and gu systems are negative, except as otherwise noted.      Objective    /69   " Pulse 70   Temp 97.5  F (36.4  C) (Temporal)   Resp 15   Ht 1.745 m (5' 8.7\")   Wt 53.8 kg (118 lb 8 oz)   LMP 06/21/2023 (Exact Date)   SpO2 100%   BMI 17.65 kg/m    Body mass index is 17.65 kg/m .  Physical Exam   GENERAL: healthy, alert and no distress  CV: S1, S2 without gallops, clicks, rubs, or murmurs.  Respiratory: Lungs clear to auscultation  MS: Plantarflexion noted with left leg calf squeeze.  SKIN:  No bruising noted on left leg            "

## 2023-07-21 LAB — FERRITIN SERPL-MCNC: 75 NG/ML (ref 6–175)

## 2023-09-05 ENCOUNTER — PRE VISIT (OUTPATIENT)
Dept: OTOLARYNGOLOGY | Facility: CLINIC | Age: 23
End: 2023-09-05

## 2023-09-27 ENCOUNTER — PATIENT OUTREACH (OUTPATIENT)
Dept: CARE COORDINATION | Facility: CLINIC | Age: 23
End: 2023-09-27
Payer: COMMERCIAL

## 2023-10-11 ENCOUNTER — PATIENT OUTREACH (OUTPATIENT)
Dept: CARE COORDINATION | Facility: CLINIC | Age: 23
End: 2023-10-11
Payer: COMMERCIAL

## 2023-12-31 ENCOUNTER — HEALTH MAINTENANCE LETTER (OUTPATIENT)
Age: 23
End: 2023-12-31

## 2025-01-19 ENCOUNTER — HEALTH MAINTENANCE LETTER (OUTPATIENT)
Age: 25
End: 2025-01-19